# Patient Record
Sex: FEMALE
[De-identification: names, ages, dates, MRNs, and addresses within clinical notes are randomized per-mention and may not be internally consistent; named-entity substitution may affect disease eponyms.]

---

## 2021-03-19 PROBLEM — Z00.00 ENCOUNTER FOR PREVENTIVE HEALTH EXAMINATION: Status: ACTIVE | Noted: 2021-03-19

## 2021-03-23 ENCOUNTER — APPOINTMENT (OUTPATIENT)
Dept: COLORECTAL SURGERY | Facility: CLINIC | Age: 61
End: 2021-03-23
Payer: COMMERCIAL

## 2021-03-23 DIAGNOSIS — M62.89 OTHER SPECIFIED DISORDERS OF MUSCLE: ICD-10-CM

## 2021-03-23 DIAGNOSIS — K64.1 SECOND DEGREE HEMORRHOIDS: ICD-10-CM

## 2021-03-23 DIAGNOSIS — Z78.9 OTHER SPECIFIED HEALTH STATUS: ICD-10-CM

## 2021-03-23 PROCEDURE — 99072 ADDL SUPL MATRL&STAF TM PHE: CPT

## 2021-03-23 PROCEDURE — 99242 OFF/OP CONSLTJ NEW/EST SF 20: CPT

## 2021-03-24 PROBLEM — K64.1 GRADE II HEMORRHOIDS: Status: ACTIVE | Noted: 2021-03-24

## 2021-03-24 PROBLEM — M62.89 PELVIC FLOOR DYSFUNCTION: Status: ACTIVE | Noted: 2021-03-23

## 2021-03-24 PROBLEM — Z78.9 NEVER SMOKED TOBACCO: Status: ACTIVE | Noted: 2021-03-24

## 2021-03-24 NOTE — HISTORY OF PRESENT ILLNESS
[FreeTextEntry1] : consultation from Dr. Buckner for pelic floor disorder and hemorrhoids. 60 year old female with long standing complaints of anorectal discomfort, difficulty with evacuating bowel movements.

## 2021-03-24 NOTE — REVIEW OF SYSTEMS
[Feeling Poorly] : feeling poorly [As Noted in HPI] : as noted in HPI [Constipation] : constipation [Negative] : Heme/Lymph

## 2021-03-24 NOTE — PHYSICAL EXAM
[Abdomen Masses] : No abdominal masses [Abdomen Tenderness] : ~T No ~M abdominal tenderness [Normal rectal exam] : exam was normal [Nonprolapsing] : a nonprolapsing (grade I) [Tender, Swollen] : tender, swollen [Normal] : was normal [None] : there was no rectal mass  [JVD] : no jugular venous distention  [Normal Breath Sounds] : Normal breath sounds [Normal Heart Sounds] : normal heart sounds [Normal Rate and Rhythm] : normal rate and rhythm [No Rash or Lesion] : No rash or lesion [Alert] : alert [Oriented to Person] : oriented to person [Oriented to Place] : oriented to place [Oriented to Time] : oriented to time [Calm] : calm [de-identified] : looks well nad [de-identified] : marino collier

## 2021-03-24 NOTE — CONSULT LETTER
[Dear  ___] : Dear  [unfilled], [Consult Letter:] : I had the pleasure of evaluating your patient, [unfilled]. [Please see my note below.] : Please see my note below. [Consult Closing:] : Thank you very much for allowing me to participate in the care of this patient.  If you have any questions, please do not hesitate to contact me. [Sincerely,] : Sincerely, [FreeTextEntry3] : Doug Cespedes MD

## 2024-09-05 ENCOUNTER — RX ONLY (RX ONLY)
Age: 64
End: 2024-09-05

## 2024-09-05 ENCOUNTER — OFFICE (OUTPATIENT)
Facility: LOCATION | Age: 64
Setting detail: OPHTHALMOLOGY
End: 2024-09-05
Payer: COMMERCIAL

## 2024-09-05 DIAGNOSIS — H43.811: ICD-10-CM

## 2024-09-05 DIAGNOSIS — H43.393: ICD-10-CM

## 2024-09-05 DIAGNOSIS — H25.13: ICD-10-CM

## 2024-09-05 PROCEDURE — 92250 FUNDUS PHOTOGRAPHY W/I&R: CPT | Performed by: OPHTHALMOLOGY

## 2024-09-05 PROCEDURE — 92014 COMPRE OPH EXAM EST PT 1/>: CPT | Performed by: OPHTHALMOLOGY

## 2024-09-05 ASSESSMENT — CONFRONTATIONAL VISUAL FIELD TEST (CVF)
OD_FINDINGS: FULL
OS_FINDINGS: FULL

## 2024-10-15 ENCOUNTER — RX ONLY (RX ONLY)
Age: 64
End: 2024-10-15

## 2024-10-15 ENCOUNTER — OFFICE (OUTPATIENT)
Facility: LOCATION | Age: 64
Setting detail: OPHTHALMOLOGY
End: 2024-10-15
Payer: COMMERCIAL

## 2024-10-15 DIAGNOSIS — H10.45: ICD-10-CM

## 2024-10-15 PROCEDURE — 99213 OFFICE O/P EST LOW 20 MIN: CPT | Performed by: OPHTHALMOLOGY

## 2024-10-15 ASSESSMENT — CONFRONTATIONAL VISUAL FIELD TEST (CVF)
OD_FINDINGS: FULL
OS_FINDINGS: FULL

## 2024-10-15 ASSESSMENT — TONOMETRY
OS_IOP_MMHG: 15
OD_IOP_MMHG: 15

## 2024-10-16 PROBLEM — H10.45 ALLERGIC CONJUNCTIVITIS ; BOTH EYES: Status: ACTIVE | Noted: 2024-10-15

## 2024-10-16 ASSESSMENT — REFRACTION_AUTOREFRACTION
OD_CYLINDER: +0.25
OS_CYLINDER: +1.50
OD_AXIS: 037
OS_AXIS: 167
OS_SPHERE: -1.75
OD_SPHERE: -1.25

## 2024-10-16 ASSESSMENT — REFRACTION_CURRENTRX
OS_SPHERE: -1.75
OD_AXIS: 016
OS_AXIS: 174
OD_CYLINDER: +0.75
OD_SPHERE: -2.00
OS_OVR_VA: 20/
OD_OVR_VA: 20/
OS_CYLINDER: +1.00

## 2024-10-16 ASSESSMENT — KERATOMETRY
OD_K2POWER_DIOPTERS: 47.50
OD_K1POWER_DIOPTERS: 47.00
OS_K1POWER_DIOPTERS: 46.25
OS_AXISANGLE_DEGREES: 145
OD_AXISANGLE_DEGREES: 066
OS_K2POWER_DIOPTERS: 46.50

## 2024-10-16 ASSESSMENT — VISUAL ACUITY
OD_BCVA: 20/25+2
OS_BCVA: 20/20-2

## 2024-11-11 ENCOUNTER — OFFICE (OUTPATIENT)
Facility: LOCATION | Age: 64
Setting detail: OPHTHALMOLOGY
End: 2024-11-11
Payer: COMMERCIAL

## 2024-11-11 ENCOUNTER — RX ONLY (RX ONLY)
Age: 64
End: 2024-11-11

## 2024-11-11 DIAGNOSIS — H10.45: ICD-10-CM

## 2024-11-11 DIAGNOSIS — H52.4: ICD-10-CM

## 2024-11-11 DIAGNOSIS — H25.13: ICD-10-CM

## 2024-11-11 DIAGNOSIS — H31.29: ICD-10-CM

## 2024-11-11 PROCEDURE — 99213 OFFICE O/P EST LOW 20 MIN: CPT | Performed by: OPHTHALMOLOGY

## 2024-11-11 PROCEDURE — 92250 FUNDUS PHOTOGRAPHY W/I&R: CPT | Performed by: OPHTHALMOLOGY

## 2024-11-11 PROCEDURE — 92015 DETERMINE REFRACTIVE STATE: CPT | Performed by: OPHTHALMOLOGY

## 2024-11-11 ASSESSMENT — CONFRONTATIONAL VISUAL FIELD TEST (CVF)
OD_FINDINGS: FULL
OS_FINDINGS: FULL

## 2024-11-18 PROBLEM — H31.29 PERIPAPILLARY ATROPHY ; BOTH EYES: Status: ACTIVE | Noted: 2024-11-11

## 2024-11-18 ASSESSMENT — REFRACTION_CURRENTRX
OS_AXIS: 174
OD_AXIS: 016
OS_CYLINDER: +1.00
OS_OVR_VA: 20/
OD_OVR_VA: 20/
OS_SPHERE: -1.75
OD_SPHERE: -2.00
OD_CYLINDER: +0.75

## 2024-11-18 ASSESSMENT — REFRACTION_MANIFEST
OD_SPHERE: -1.75
OD_VA1: 20/20
OS_VA2: 20/20
OD_ADD: +2.00
OD_CYLINDER: +0.50
OS_VA1: 20/20
OS_AXIS: 170
OD_VA2: 20/20
OS_CYLINDER: +1.00
OS_SPHERE: -2.00
OS_ADD: +2.00
OD_AXIS: 020
OU_VA: 20/20

## 2024-11-18 ASSESSMENT — KERATOMETRY
OS_AXISANGLE_DEGREES: 145
OS_K1POWER_DIOPTERS: 46.25
OD_AXISANGLE_DEGREES: 066
OD_K2POWER_DIOPTERS: 47.50
OD_K1POWER_DIOPTERS: U/A
OS_K2POWER_DIOPTERS: 46.50

## 2024-11-18 ASSESSMENT — REFRACTION_AUTOREFRACTION
OD_AXIS: 020
OD_SPHERE: -1.75
OS_SPHERE: -2.00
OS_AXIS: 163
OD_CYLINDER: +0.50
OS_CYLINDER: +1.50

## 2024-11-18 ASSESSMENT — VISUAL ACUITY
OS_BCVA: 20/20-1
OD_BCVA: 20/20-2

## 2025-06-10 ENCOUNTER — OFFICE (OUTPATIENT)
Facility: LOCATION | Age: 65
Setting detail: OPHTHALMOLOGY
End: 2025-06-10
Payer: COMMERCIAL

## 2025-06-10 ENCOUNTER — RX ONLY (RX ONLY)
Age: 65
End: 2025-06-10

## 2025-06-10 DIAGNOSIS — H35.443: ICD-10-CM

## 2025-06-10 DIAGNOSIS — H25.13: ICD-10-CM

## 2025-06-10 DIAGNOSIS — H10.433: ICD-10-CM

## 2025-06-10 PROCEDURE — 92250 FUNDUS PHOTOGRAPHY W/I&R: CPT | Performed by: OPHTHALMOLOGY

## 2025-06-10 PROCEDURE — 92014 COMPRE OPH EXAM EST PT 1/>: CPT | Performed by: OPHTHALMOLOGY

## 2025-06-10 ASSESSMENT — REFRACTION_MANIFEST
OD_VA2: 20/20
OD_AXIS: 020
OU_VA: 20/20
OD_CYLINDER: +0.50
OS_SPHERE: -2.00
OD_SPHERE: -1.75
OS_VA2: 20/20
OS_ADD: +2.00
OS_AXIS: 170
OS_CYLINDER: +1.00
OD_ADD: +2.00
OS_VA1: 20/20
OD_VA1: 20/20

## 2025-06-10 ASSESSMENT — REFRACTION_CURRENTRX
OD_AXIS: 010
OS_OVR_VA: 20/
OD_CYLINDER: +0.50
OS_SPHERE: -1.75
OS_CYLINDER: +1.00
OS_AXIS: 174
OD_OVR_VA: 20/
OD_SPHERE: -1.50

## 2025-06-10 ASSESSMENT — REFRACTION_AUTOREFRACTION
OD_CYLINDER: +0.50
OS_AXIS: 163
OD_AXIS: 020
OD_SPHERE: -1.75
OS_SPHERE: -2.00
OS_CYLINDER: +1.50

## 2025-06-10 ASSESSMENT — KERATOMETRY
OD_K2POWER_DIOPTERS: 47.50
OS_K1POWER_DIOPTERS: 46.25
OS_AXISANGLE_DEGREES: 145
OD_AXISANGLE_DEGREES: 066
OD_K1POWER_DIOPTERS: U/A
OS_K2POWER_DIOPTERS: 46.50

## 2025-06-10 ASSESSMENT — CONFRONTATIONAL VISUAL FIELD TEST (CVF)
OD_FINDINGS: FULL
OS_FINDINGS: FULL

## 2025-06-10 ASSESSMENT — VISUAL ACUITY
OS_BCVA: 20/25+2
OD_BCVA: 20/20-2

## 2025-06-10 ASSESSMENT — TONOMETRY
OD_IOP_MMHG: 16
OS_IOP_MMHG: 16

## 2025-08-21 ENCOUNTER — OFFICE (OUTPATIENT)
Facility: LOCATION | Age: 65
Setting detail: OPHTHALMOLOGY
End: 2025-08-21
Payer: COMMERCIAL

## 2025-08-21 ENCOUNTER — RX ONLY (RX ONLY)
Age: 65
End: 2025-08-21

## 2025-08-21 DIAGNOSIS — H00.022: ICD-10-CM

## 2025-08-21 PROCEDURE — 99213 OFFICE O/P EST LOW 20 MIN: CPT | Performed by: OPTOMETRIST

## 2025-08-21 ASSESSMENT — REFRACTION_MANIFEST
OD_SPHERE: -1.75
OU_VA: 20/20
OS_VA1: 20/20
OS_VA2: 20/20
OS_AXIS: 170
OS_CYLINDER: +1.00
OS_SPHERE: -2.00
OD_AXIS: 020
OD_CYLINDER: +0.50
OS_ADD: +2.00
OD_ADD: +2.00
OD_VA1: 20/20
OD_VA2: 20/20

## 2025-08-21 ASSESSMENT — VISUAL ACUITY
OD_BCVA: 20/20
OS_BCVA: 20/25-1

## 2025-08-21 ASSESSMENT — CONFRONTATIONAL VISUAL FIELD TEST (CVF)
OD_FINDINGS: FULL
OS_FINDINGS: FULL

## 2025-08-21 ASSESSMENT — REFRACTION_CURRENTRX
OS_SPHERE: -1.75
OS_AXIS: 174
OD_SPHERE: -1.50
OS_OVR_VA: 20/
OD_AXIS: 010
OS_CYLINDER: +1.00
OD_OVR_VA: 20/
OD_CYLINDER: +0.50

## 2025-08-21 ASSESSMENT — REFRACTION_AUTOREFRACTION
OD_SPHERE: -1.75
OD_CYLINDER: +0.50
OS_CYLINDER: +1.50
OS_AXIS: 163
OD_AXIS: 020
OS_SPHERE: -2.00

## 2025-08-21 ASSESSMENT — KERATOMETRY
OS_K1POWER_DIOPTERS: 46.25
OD_K1POWER_DIOPTERS: U/A
OD_K2POWER_DIOPTERS: 47.50
OD_AXISANGLE_DEGREES: 066
OS_K2POWER_DIOPTERS: 46.50
OS_AXISANGLE_DEGREES: 145

## 2025-08-21 ASSESSMENT — TONOMETRY: OD_IOP_MMHG: 17
